# Patient Record
Sex: FEMALE | Race: WHITE | NOT HISPANIC OR LATINO | Employment: UNEMPLOYED | URBAN - METROPOLITAN AREA
[De-identification: names, ages, dates, MRNs, and addresses within clinical notes are randomized per-mention and may not be internally consistent; named-entity substitution may affect disease eponyms.]

---

## 2020-12-02 ENCOUNTER — HOSPITAL ENCOUNTER (OUTPATIENT)
Dept: GASTROENTEROLOGY | Facility: HOSPITAL | Age: 50
Setting detail: HOSPITAL OUTPATIENT SURGERY
Discharge: HOME | End: 2020-12-02
Attending: INTERNAL MEDICINE

## 2020-12-02 LAB — HCG UR QL: NEGATIVE

## 2021-06-21 ENCOUNTER — TRANSCRIBE ORDERS (OUTPATIENT)
Dept: ADMINISTRATIVE | Facility: HOSPITAL | Age: 51
End: 2021-06-21

## 2021-06-21 DIAGNOSIS — E04.9 GOITER: Primary | ICD-10-CM

## 2021-07-09 ENCOUNTER — HOSPITAL ENCOUNTER (OUTPATIENT)
Dept: ULTRASOUND IMAGING | Facility: HOSPITAL | Age: 51
Discharge: HOME | End: 2021-07-09
Admitting: NURSE PRACTITIONER

## 2021-07-09 DIAGNOSIS — E04.9 GOITER: ICD-10-CM

## 2021-07-09 PROCEDURE — 25010000003 LIDOCAINE 1 % SOLUTION: Performed by: NURSE PRACTITIONER

## 2021-07-09 PROCEDURE — 76942 ECHO GUIDE FOR BIOPSY: CPT

## 2021-07-09 PROCEDURE — 88173 CYTOPATH EVAL FNA REPORT: CPT | Performed by: NURSE PRACTITIONER

## 2021-07-09 PROCEDURE — 76942 ECHO GUIDE FOR BIOPSY: CPT | Performed by: RADIOLOGY

## 2021-07-09 PROCEDURE — 10005 FNA BX W/US GDN 1ST LES: CPT | Performed by: RADIOLOGY

## 2021-07-09 RX ORDER — LIDOCAINE HYDROCHLORIDE 10 MG/ML
10 INJECTION, SOLUTION INFILTRATION; PERINEURAL ONCE
Status: COMPLETED | OUTPATIENT
Start: 2021-07-09 | End: 2021-07-09

## 2021-07-09 RX ADMIN — LIDOCAINE HYDROCHLORIDE 10 ML: 10 INJECTION, SOLUTION INFILTRATION; PERINEURAL at 13:25

## 2021-07-13 LAB
CYTO UR: NORMAL
LAB AP CASE REPORT: NORMAL
LAB AP CLINICAL INFORMATION: NORMAL
LAB AP NON-GYN SPECIMEN ADEQUACY: NORMAL
PATH REPORT.FINAL DX SPEC: NORMAL
PATH REPORT.GROSS SPEC: NORMAL

## 2022-02-14 ENCOUNTER — LAB (OUTPATIENT)
Dept: LAB | Facility: HOSPITAL | Age: 52
End: 2022-02-14

## 2022-02-14 ENCOUNTER — OFFICE VISIT (OUTPATIENT)
Dept: GASTROENTEROLOGY | Facility: CLINIC | Age: 52
End: 2022-02-14

## 2022-02-14 VITALS
BODY MASS INDEX: 34.01 KG/M2 | SYSTOLIC BLOOD PRESSURE: 136 MMHG | WEIGHT: 224.4 LBS | HEIGHT: 68 IN | HEART RATE: 72 BPM | DIASTOLIC BLOOD PRESSURE: 95 MMHG

## 2022-02-14 DIAGNOSIS — R19.7 DIARRHEA, UNSPECIFIED TYPE: ICD-10-CM

## 2022-02-14 DIAGNOSIS — R14.0 ABDOMINAL BLOATING: ICD-10-CM

## 2022-02-14 DIAGNOSIS — R14.0 ABDOMINAL BLOATING: Primary | ICD-10-CM

## 2022-02-14 DIAGNOSIS — R15.2 FECAL URGENCY: ICD-10-CM

## 2022-02-14 PROCEDURE — 36415 COLL VENOUS BLD VENIPUNCTURE: CPT

## 2022-02-14 PROCEDURE — 86258 DGP ANTIBODY EACH IG CLASS: CPT

## 2022-02-14 PROCEDURE — 86364 TISS TRNSGLTMNASE EA IG CLAS: CPT

## 2022-02-14 PROCEDURE — 82784 ASSAY IGA/IGD/IGG/IGM EACH: CPT

## 2022-02-14 PROCEDURE — 99214 OFFICE O/P EST MOD 30 MIN: CPT | Performed by: NURSE PRACTITIONER

## 2022-02-14 RX ORDER — DICYCLOMINE HCL 20 MG
20 TABLET ORAL EVERY 6 HOURS PRN
Qty: 120 TABLET | Refills: 1 | Status: SHIPPED | OUTPATIENT
Start: 2022-02-14

## 2022-02-14 RX ORDER — CARVEDILOL 12.5 MG/1
TABLET ORAL
COMMUNITY

## 2022-02-14 RX ORDER — CETIRIZINE HYDROCHLORIDE, PSEUDOEPHEDRINE HYDROCHLORIDE 5; 120 MG/1; MG/1
TABLET, FILM COATED, EXTENDED RELEASE ORAL
COMMUNITY

## 2022-02-14 RX ORDER — OMEGA-3 FATTY ACIDS/FISH OIL 300-1000MG
CAPSULE ORAL
COMMUNITY

## 2022-02-14 RX ORDER — LOSARTAN POTASSIUM 50 MG/1
TABLET ORAL
COMMUNITY
Start: 2022-02-07

## 2022-02-14 RX ORDER — NORETHINDRONE ACETATE AND ETHINYL ESTRADIOL 1MG-20(21)
KIT ORAL
COMMUNITY
End: 2022-05-17

## 2022-02-14 NOTE — PROGRESS NOTES
Chief Complaint  dyspepsia    April Douglas is a 51 y.o. female who presents to [facility] on referral from Cachorro Bang MD for a gastroenterology evaluation of dyspepsia, bloating and diarrhea.      History of Present Illness    She reports gas that's been present for several years.  Worse at night when lying down.  For the past one year, she's had loose stools 5-6 times per day.  The first bowel movements in the morning is the most formed.  She describes stool to be more like #5 on the bristol stool chart.  Admits fecal urgency.  This is worse when eating out.      She has tried cutting back on gluten and lactose.      She uses and essential oil blend that helps relax stomach more.        She has developed hemorrhoids since loose stool has started.  She's using baneal lotion and it helps with discomfort.      Reports only occasional abdominal discomfort in epigastric region.  Will also experience abdominal bloating with abdominal distention.  She's unable to belch.      Denies nausea and vomiting unless she takes simethicone.        She has previously tried probiotics with no improvement in symptoms.      In her 20's, she was diagnosed with giardia.       History reviewed. No pertinent past medical history.    Past Surgical History:   Procedure Laterality Date   • CHOLECYSTECTOMY  1999   • COLONOSCOPY  2020         Current Outpatient Medications:   •  carvedilol (COREG) 12.5 MG tablet, carvedilol 12.5 mg tablet, Disp: , Rfl:   •  cetirizine-pseudoephedrine (ZyrTEC-D) 5-120 MG per 12 hr tablet, Take  by mouth., Disp: , Rfl:   •  Ibuprofen 200 MG capsule, ibuprofen 200 mg oral capsule take 1 - 2 capsules (200 - 400 mg) by oral route every 6 hours as needed   Active, Disp: , Rfl:   •  losartan (COZAAR) 50 MG tablet, , Disp: , Rfl:   •  norethindrone-ethinyl estradiol FE (Loestrin Fe 1/20) 1-20 MG-MCG per tablet, Loestrin Fe 1/20 (28-Day) 1 mg-20 mcg (21)/75 mg (7) oral  "tablet take 1 tablet by oral route once daily   Active, Disp: , Rfl:   •  dicyclomine (BENTYL) 20 MG tablet, Take 1 tablet by mouth Every 6 (Six) Hours As Needed (abdominal pain and diarrhea)., Disp: 120 tablet, Rfl: 1     Allergies   Allergen Reactions   • Clarithromycin Rash     rash         History reviewed. No pertinent family history.     Social History     Social History Narrative   • Not on file       Immunization:    There is no immunization history on file for this patient.     Objective       Vital Signs:   /95 (BP Location: Left arm, Patient Position: Sitting, Cuff Size: Adult)   Pulse 72   Ht 172.7 cm (68\")   Wt 102 kg (224 lb 6.4 oz)   BMI 34.12 kg/m²       Physical Exam  Constitutional:       General: She is not in acute distress.     Appearance: Normal appearance. She is well-developed and normal weight.   HENT:      Head: Normocephalic and atraumatic.   Eyes:      Conjunctiva/sclera: Conjunctivae normal.      Pupils: Pupils are equal, round, and reactive to light.      Visual Fields: Right eye visual fields normal and left eye visual fields normal.   Cardiovascular:      Rate and Rhythm: Normal rate and regular rhythm.      Heart sounds: Normal heart sounds.   Pulmonary:      Effort: Pulmonary effort is normal. No retractions.      Breath sounds: Normal breath sounds and air entry.   Abdominal:      General: Bowel sounds are normal.      Palpations: Abdomen is soft.      Tenderness: There is no abdominal tenderness.      Comments: No appreciable hepatosplenomegaly or ascites   Musculoskeletal:         General: Normal range of motion.      Cervical back: Neck supple.      Right lower leg: No edema.      Left lower leg: No edema.   Lymphadenopathy:      Cervical: No cervical adenopathy.   Skin:     General: Skin is warm and dry.      Findings: No lesion.   Neurological:      General: No focal deficit present.      Mental Status: She is alert and oriented to person, place, and time. "   Psychiatric:         Mood and Affect: Mood and affect normal.         Behavior: Behavior normal.         Result Review :   The following data was reviewed by: CLARISSE Lyles on 02/14/2022:    12/2/2020 colonoscopy-normal mucosa in the whole colon.                      Assessment and Plan    Diagnoses and all orders for this visit:    1. Abdominal bloating (Primary)  -     Celiac Panel Reflex To Titer; Future  -     Clostridium Difficile Toxin - Stool, Per Rectum; Future  -     Enteric Bacterial Panel - Stool, Per Rectum; Future  -     Enteric Parasite Panel - Stool, Per Rectum; Future  -     Occult Blood, Fecal By Immunoassay - Stool, Per Rectum; Future  -     Fecal Lactoferrin Qual. - Stool, Per Rectum; Future  -     Pancreatic Elastase, Fecal - Stool, Per Rectum; Future  -     H. Pylori Antigen, Stool - Stool, Per Rectum    2. Diarrhea, unspecified type  -     Celiac Panel Reflex To Titer; Future  -     Clostridium Difficile Toxin - Stool, Per Rectum; Future  -     Enteric Bacterial Panel - Stool, Per Rectum; Future  -     Enteric Parasite Panel - Stool, Per Rectum; Future  -     Occult Blood, Fecal By Immunoassay - Stool, Per Rectum; Future  -     Fecal Lactoferrin Qual. - Stool, Per Rectum; Future  -     Pancreatic Elastase, Fecal - Stool, Per Rectum; Future  -     H. Pylori Antigen, Stool - Stool, Per Rectum  -     dicyclomine (BENTYL) 20 MG tablet; Take 1 tablet by mouth Every 6 (Six) Hours As Needed (abdominal pain and diarrhea).  Dispense: 120 tablet; Refill: 1    3. Fecal urgency  -     Celiac Panel Reflex To Titer; Future  -     Clostridium Difficile Toxin - Stool, Per Rectum; Future  -     Enteric Bacterial Panel - Stool, Per Rectum; Future  -     Enteric Parasite Panel - Stool, Per Rectum; Future  -     Occult Blood, Fecal By Immunoassay - Stool, Per Rectum; Future  -     Fecal Lactoferrin Qual. - Stool, Per Rectum; Future  -     Pancreatic Elastase, Fecal - Stool, Per Rectum;  Future      Discussed with patient that if labs and stools are unrevealing, would consider sucrose breath test.  May also consider trial of xifaxin or colestid in the future.  Encouraged low FODMAP diet and food/symptoms diary.        Follow Up   Return in about 3 months (around 5/14/2022) for IBS.  Patient was given instructions and counseling regarding her condition or for health maintenance advice. Please see specific information pulled into the AVS if appropriate.

## 2022-02-14 NOTE — PATIENT INSTRUCTIONS
Low-FODMAP Eating Plan    FODMAP stands for fermentable oligosaccharides, disaccharides, monosaccharides, and polyols. These are sugars that are hard for some people to digest. A low-FODMAP eating plan may help some people who have irritable bowel syndrome (IBS) and certain other bowel (intestinal) diseases to manage their symptoms.  This meal plan can be complicated to follow. Work with a diet and nutrition specialist (dietitian) to make a low-FODMAP eating plan that is right for you. A dietitian can help make sure that you get enough nutrition from this diet.  What are tips for following this plan?  Reading food labels  · Check labels for hidden FODMAPs such as:  ? High-fructose syrup.  ? Honey.  ? Agave.  ? Natural fruit flavors.  ? Onion or garlic powder.  · Choose low-FODMAP foods that contain 3-4 grams of fiber per serving.  · Check food labels for serving sizes. Eat only one serving at a time to make sure FODMAP levels stay low.  Shopping  · Shop with a list of foods that are recommended on this diet and make a meal plan.  Meal planning  · Follow a low-FODMAP eating plan for up to 6 weeks, or as told by your health care provider or dietitian.  · To follow the eating plan:  1. Eliminate high-FODMAP foods from your diet completely. Choose only low-FODMAP foods to eat. You will do this for 2-6 weeks.  2. Gradually reintroduce high-FODMAP foods into your diet one at a time. Most people should wait a few days before introducing the next new high-FODMAP food into their meal plan. Your dietitian can recommend how quickly you may reintroduce foods.  3. Keep a daily record of what and how much you eat and drink. Make note of any symptoms that you have after eating.  4. Review your daily record with a dietitian regularly to identify which foods you can eat and which foods you should avoid.  General tips  · Drink enough fluid each day to keep your urine pale yellow.  · Avoid processed foods. These often have added sugar  "and may be high in FODMAPs.  · Avoid most dairy products, whole grains, and sweeteners.  · Work with a dietitian to make sure you get enough fiber in your diet.  · Avoid high FODMAP foods at meals to manage symptoms.  Recommended foods  Fruits  Bananas, oranges, tangerines, erica, limes, blueberries, raspberries, strawberries, grapes, cantaloupe, honeydew melon, kiwi, papaya, passion fruit, and pineapple. Limited amounts of dried cranberries, banana chips, and shredded coconut.  Vegetables  Eggplant, zucchini, cucumber, peppers, green beans, bean sprouts, lettuce, arugula, kale, Swiss chard, spinach, velvet greens, bok john, summer squash, potato, and tomato. Limited amounts of corn, carrot, and sweet potato. Green parts of scallions.  Grains  Gluten-free grains, such as rice, oats, buckwheat, quinoa, corn, polenta, and millet. Gluten-free pasta, bread, or cereal. Rice noodles. Corn tortillas.  Meats and other proteins  Unseasoned beef, pork, poultry, or fish. Eggs. Bartholomew. Tofu (firm) and tempeh. Limited amounts of nuts and seeds, such as almonds, walnuts, brazil nuts, pecans, peanuts, nut butters, pumpkin seeds, franci seeds, and sunflower seeds.  Dairy  Lactose-free milk, yogurt, and kefir. Lactose-free cottage cheese and ice cream. Non-dairy milks, such as almond, coconut, hemp, and rice milk. Non-dairy yogurt. Limited amounts of goat cheese, brie, mozzarella, parmesan, swiss, and other hard cheeses.  Fats and oils  Butter-free spreads. Vegetable oils, such as olive, canola, and sunflower oil.  Seasoning and other foods  Artificial sweeteners with names that do not end in \"ol,\" such as aspartame, saccharine, and stevia. Maple syrup, white table sugar, raw sugar, brown sugar, and molasses. Mayonnaise, soy sauce, and tamari. Fresh basil, coriander, parsley, rosemary, and thyme.  Beverages  Water and mineral water. Sugar-sweetened soft drinks. Small amounts of orange juice or cranberry juice. Black and green tea. " Most dry triny. Coffee.  The items listed above may not be a complete list of foods and beverages you can eat. Contact a dietitian for more information.  Foods to avoid  Fruits  Fresh, dried, and juiced forms of apple, pear, watermelon, peach, plum, cherries, apricots, blackberries, boysenberries, figs, nectarines, and guanakito. Avocado.  Vegetables  Chicory root, artichoke, asparagus, cabbage, snow peas, [place] sprouts, broccoli, sugar snap peas, mushrooms, celery, and cauliflower. Onions, garlic, leeks, and the white part of scallions.  Grains  Wheat, including kamut, durum, and semolina. Barley and bulgur. Couscous. Wheat-based cereals. Wheat noodles, bread, crackers, and pastries.  Meats and other proteins  Fried or fatty meat. Sausage. Cashews and pistachios. Soybeans, baked beans, black beans, chickpeas, kidney beans, gilbert beans, navy beans, lentils, black-eyed peas, and split peas.  Dairy  Milk, yogurt, ice cream, and soft cheese. Cream and sour cream. Milk-based sauces. Custard. Buttermilk. Soy milk.  Seasoning and other foods  Any sugar-free gum or candy. Foods that contain artificial sweeteners such as sorbitol, mannitol, isomalt, or xylitol. Foods that contain honey, high-fructose corn syrup, or agave. Bouillon, vegetable stock, beef stock, and chicken stock. Garlic and onion powder. Condiments made with onion, such as hummus, chutney, pickles, relish, salad dressing, and salsa. Tomato paste.  Beverages  Chicory-based drinks. Coffee substitutes. Chamomile tea. Fennel tea. Sweet or fortified triny such as port or agnieszka. Diet soft drinks made with isomalt, mannitol, maltitol, sorbitol, or xylitol. Apple, pear, and guanakito juice. Juices with high-fructose corn syrup.  The items listed above may not be a complete list of foods and beverages you should avoid. Contact a dietitian for more information.  Summary  · FODMAP stands for fermentable oligosaccharides, disaccharides, monosaccharides, and polyols. These  are sugars that are hard for some people to digest.  · A low-FODMAP eating plan is a short-term diet that helps to ease symptoms of certain bowel diseases.  · The eating plan usually lasts up to 6 weeks. After that, high-FODMAP foods are reintroduced gradually and one at a time. This can help you find out which foods may be causing symptoms.  · A low-FODMAP eating plan can be complicated. It is best to work with a dietitian who has experience with this type of plan.  This information is not intended to replace advice given to you by your health care provider. Make sure you discuss any questions you have with your health care provider.  Document Revised: 05/06/2021 Document Reviewed: 05/06/2021  Elsevier Patient Education © 2021 [employer]

## 2022-02-15 ENCOUNTER — LAB (OUTPATIENT)
Dept: LAB | Facility: HOSPITAL | Age: 52
End: 2022-02-15

## 2022-02-15 DIAGNOSIS — R14.0 ABDOMINAL BLOATING: ICD-10-CM

## 2022-02-15 DIAGNOSIS — R19.7 DIARRHEA, UNSPECIFIED TYPE: ICD-10-CM

## 2022-02-15 DIAGNOSIS — R15.2 FECAL URGENCY: ICD-10-CM

## 2022-02-15 LAB
027 TOXIN: NORMAL
C DIFF TOX GENS STL QL NAA+PROBE: NEGATIVE
GLIADIN PEPTIDE IGA SER-ACNC: 3 UNITS (ref 0–19)
H. PYLORI ANTIGEN STOOL: NEGATIVE
HEMOCCULT STL QL IA: NEGATIVE
IGA SERPL-MCNC: 241 MG/DL (ref 87–352)
LACTOFERRIN STL QL LA: NEGATIVE
TTG IGA SER-ACNC: <2 U/ML (ref 0–3)

## 2022-02-15 PROCEDURE — 83630 LACTOFERRIN FECAL (QUAL): CPT

## 2022-02-15 PROCEDURE — 87338 HPYLORI STOOL AG IA: CPT | Performed by: NURSE PRACTITIONER

## 2022-02-15 PROCEDURE — 82274 ASSAY TEST FOR BLOOD FECAL: CPT

## 2022-02-15 PROCEDURE — 87493 C DIFF AMPLIFIED PROBE: CPT

## 2022-02-15 PROCEDURE — 87506 IADNA-DNA/RNA PROBE TQ 6-11: CPT

## 2022-02-15 PROCEDURE — 82653 EL-1 FECAL QUANTITATIVE: CPT

## 2022-02-16 ENCOUNTER — TELEPHONE (OUTPATIENT)
Dept: GASTROENTEROLOGY | Facility: CLINIC | Age: 52
End: 2022-02-16

## 2022-02-16 LAB
C COLI+JEJ+UPSA DNA STL QL NAA+NON-PROBE: NOT DETECTED
CRYPTOSP DNA STL QL NAA+NON-PROBE: NOT DETECTED
E HISTOLYT DNA STL QL NAA+NON-PROBE: NOT DETECTED
EC STX1+STX2 GENES STL QL NAA+NON-PROBE: NOT DETECTED
G LAMBLIA DNA STL QL NAA+NON-PROBE: NOT DETECTED
S ENT+BONG DNA STL QL NAA+NON-PROBE: NOT DETECTED
SHIGELLA SP+EIEC IPAH ST NAA+NON-PROBE: NOT DETECTED

## 2022-02-16 NOTE — TELEPHONE ENCOUNTER
----- Message from CLARISSE Lyles sent at 2/16/2022  2:17 PM EST -----  Please let patient know that stool studies are all normal.  Has she noticed any improvement with the dicyclomine?  If not would like to add Colestid to see if that improves symptoms.

## 2022-02-16 NOTE — TELEPHONE ENCOUNTER
Patient notified of results, she has not started dicyclomine yet so I instructed her to start and give us a call if there is not improvement.

## 2022-02-18 LAB — ELASTASE PANC STL-MCNT: >500 UG ELAST./G

## 2022-02-21 ENCOUNTER — TRANSCRIBE ORDERS (OUTPATIENT)
Dept: ADMINISTRATIVE | Facility: HOSPITAL | Age: 52
End: 2022-02-21

## 2022-02-21 DIAGNOSIS — Z12.31 SCREENING MAMMOGRAM FOR HIGH-RISK PATIENT: Primary | ICD-10-CM

## 2022-02-23 ENCOUNTER — PATIENT ROUNDING (BHMG ONLY) (OUTPATIENT)
Dept: GASTROENTEROLOGY | Facility: CLINIC | Age: 52
End: 2022-02-23

## 2022-02-23 NOTE — PROGRESS NOTES
February 23, 2022    Hello, may I speak with April Douglas?    My name is Julianna CELIS     I am  with [facility]  [address] [address]  [facility] [address] [phone].    Before we get started may I verify your date of birth? 1970    I am calling to officially welcome you to our practice and ask about your recent visit. Is this a good time to talk? Yes     Tell me about your visit with us. What things went well?  Really liked Ana she was very easy to talk to & ordered labs that I felt was needed. It was a good visit      We're always looking for ways to make our patients' experiences even better. Do you have recommendations on ways we may improve?  No    Overall were you satisfied with your first visit to our practice? Yes        I appreciate you taking the time to speak with me today. Is there anything else I can do for you? No      Thank you, and have a great day.

## 2022-04-18 ENCOUNTER — HOSPITAL ENCOUNTER (OUTPATIENT)
Dept: MAMMOGRAPHY | Facility: HOSPITAL | Age: 52
Discharge: HOME | End: 2022-04-18
Admitting: INTERNAL MEDICINE

## 2022-04-18 DIAGNOSIS — Z12.31 SCREENING MAMMOGRAM FOR HIGH-RISK PATIENT: ICD-10-CM

## 2022-04-18 PROCEDURE — 77063 BREAST TOMOSYNTHESIS BI: CPT

## 2022-04-18 PROCEDURE — 77067 SCR MAMMO BI INCL CAD: CPT

## 2022-05-17 ENCOUNTER — OFFICE VISIT (OUTPATIENT)
Dept: GASTROENTEROLOGY | Facility: CLINIC | Age: 52
End: 2022-05-17

## 2022-05-17 VITALS
DIASTOLIC BLOOD PRESSURE: 84 MMHG | HEART RATE: 74 BPM | HEIGHT: 68 IN | WEIGHT: 223 LBS | BODY MASS INDEX: 33.8 KG/M2 | SYSTOLIC BLOOD PRESSURE: 134 MMHG

## 2022-05-17 DIAGNOSIS — K58.0 IRRITABLE BOWEL SYNDROME WITH DIARRHEA: Primary | ICD-10-CM

## 2022-05-17 DIAGNOSIS — R14.0 ABDOMINAL BLOATING: ICD-10-CM

## 2022-05-17 DIAGNOSIS — K64.9 HEMORRHOIDS, UNSPECIFIED HEMORRHOID TYPE: ICD-10-CM

## 2022-05-17 PROCEDURE — 99214 OFFICE O/P EST MOD 30 MIN: CPT | Performed by: NURSE PRACTITIONER

## 2022-05-17 RX ORDER — MONTELUKAST SODIUM 4 MG/1
1-2 TABLET, CHEWABLE ORAL 2 TIMES DAILY
Qty: 120 TABLET | Refills: 1 | Status: SHIPPED | OUTPATIENT
Start: 2022-05-17 | End: 2022-10-24

## 2022-05-17 RX ORDER — B.COAGUL,SUBTILIS/INULIN/VIT C 1B CELL-1G
1 TABLET,CHEWABLE ORAL DAILY
Qty: 28 EACH | Refills: 5 | Status: SHIPPED | OUTPATIENT
Start: 2022-05-17 | End: 2022-10-24

## 2022-05-17 RX ORDER — HYDROCORTISONE 25 MG/G
CREAM TOPICAL 2 TIMES DAILY
Qty: 1 EACH | Refills: 2 | Status: SHIPPED | OUTPATIENT
Start: 2022-05-17 | End: 2022-05-31

## 2022-05-17 RX ORDER — MINERAL,LANOLIN OILS/PROP GLYC
1 LOTION (ML) TOPICAL 4 TIMES DAILY PRN
Qty: 89 ML | Refills: 1 | Status: SHIPPED | OUTPATIENT
Start: 2022-05-17

## 2022-05-17 NOTE — PROGRESS NOTES
Chief Complaint  Irritable Bowel Syndrome    April Douglas is a 51 y.o. female who presents to [facility] for follow-up of abdominal bloating, diarrhea and fecal urgency.    History of present Illness    Dicyclomine was helpful for pain and slowed down bowel movements.  However, she noted feeling more bloated.  Tried IBGard which helped with the bloating, but if she takes more than a few days will experience heartburn.      She's having 5-6 bowel movements per day.  Stool burns and causes irritation in rectal area.  She is using balneol to cleanse and feels that it helps.    Previously tried imodium without improvement.            Past Medical History:   Diagnosis Date   • Irritable bowel syndrome        Past Surgical History:   Procedure Laterality Date   • CHOLECYSTECTOMY  1999   • COLONOSCOPY  2020         Current Outpatient Medications:   •  carvedilol (COREG) 12.5 MG tablet, carvedilol 12.5 mg tablet, Disp: , Rfl:   •  cetirizine-pseudoephedrine (ZyrTEC-D) 5-120 MG per 12 hr tablet, Take  by mouth., Disp: , Rfl:   •  dicyclomine (BENTYL) 20 MG tablet, Take 1 tablet by mouth Every 6 (Six) Hours As Needed (abdominal pain and diarrhea)., Disp: 120 tablet, Rfl: 1  •  Ibuprofen 200 MG capsule, ibuprofen 200 mg oral capsule take 1 - 2 capsules (200 - 400 mg) by oral route every 6 hours as needed   Active, Disp: , Rfl:   •  losartan (COZAAR) 50 MG tablet, , Disp: , Rfl:   •  3-Xmuxfqdzval-Vtpbw-N-neotetra (Culturelle IBS Complete) pack, Take 1 package by mouth Daily., Disp: 28 each, Rfl: 5  •  colestipol (COLESTID) 1 g tablet, Take 1-2 tablets by mouth 2 (Two) Times a Day., Disp: 120 tablet, Rfl: 1  •  Hydrocortisone, Perianal, (ANUSOL-HC) 2.5 % rectal cream, Insert  into the rectum 2 (Two) Times a Day for 14 days., Disp: 1 each, Rfl: 2  •  Incontinent Wash (Balneol) lotion, Apply 1 application topically 4 (Four) Times a Day As Needed (rectal pain)., Disp: 89 mL, Rfl: 1  "    Allergies   Allergen Reactions   • Clarithromycin Rash     rash         Family History   Problem Relation Age of Onset   • Colon cancer Neg Hx         Social History     Social History Narrative   • Not on file       Objective       Vital Signs:   /84 (BP Location: Left arm, Patient Position: Sitting, Cuff Size: Adult)   Pulse 74   Ht 172.7 cm (68\")   Wt 101 kg (223 lb)   BMI 33.91 kg/m²       Physical Exam  Constitutional:       General: She is not in acute distress.     Appearance: Normal appearance. She is well-developed and normal weight.   HENT:      Head: Normocephalic and atraumatic.   Eyes:      Conjunctiva/sclera: Conjunctivae normal.      Pupils: Pupils are equal, round, and reactive to light.      Visual Fields: Right eye visual fields normal and left eye visual fields normal.   Cardiovascular:      Rate and Rhythm: Normal rate and regular rhythm.      Heart sounds: Normal heart sounds.   Pulmonary:      Effort: Pulmonary effort is normal. No retractions.      Breath sounds: Normal breath sounds and air entry.   Abdominal:      General: Bowel sounds are normal.      Palpations: Abdomen is soft.      Tenderness: There is no abdominal tenderness.      Comments: No appreciable hepatosplenomegaly or ascites   Musculoskeletal:         General: Normal range of motion.      Cervical back: Neck supple.      Right lower leg: No edema.      Left lower leg: No edema.   Lymphadenopathy:      Cervical: No cervical adenopathy.   Skin:     General: Skin is warm and dry.      Findings: No lesion.   Neurological:      General: No focal deficit present.      Mental Status: She is alert and oriented to person, place, and time.   Psychiatric:         Mood and Affect: Mood and affect normal.         Behavior: Behavior normal.         Result Review :     The following data was reviewed by: CLARISSE Lyles on 05/17/2022:  2/15/2022 pancreatic elastase-greater than 500, Lactoferrin-negative, occult " blood-negative, enteric parasite panel-not detected, enteric bacterial panel-not detected.  C. difficile-negative.  H. pylori stool-negative.  Celiac serology-negative.                      Assessment and Plan    Diagnoses and all orders for this visit:    1. Irritable bowel syndrome with diarrhea (Primary)  -     colestipol (COLESTID) 1 g tablet; Take 1-2 tablets by mouth 2 (Two) Times a Day.  Dispense: 120 tablet; Refill: 1  -     4-Nlklrsswefs-Jnnyi-N-neotetra (Culturelle IBS Complete) pack; Take 1 package by mouth Daily.  Dispense: 28 each; Refill: 5    2. Abdominal bloating  -     Sucrose Breath Test; Future    3. Hemorrhoids, unspecified hemorrhoid type  -     Incontinent Wash (Balneol) lotion; Apply 1 application topically 4 (Four) Times a Day As Needed (rectal pain).  Dispense: 89 mL; Refill: 1  -     Hydrocortisone, Perianal, (ANUSOL-HC) 2.5 % rectal cream; Insert  into the rectum 2 (Two) Times a Day for 14 days.  Dispense: 1 each; Refill: 2            Follow Up   Return in about 4 months (around 9/17/2022) for IBS.  Patient was given instructions and counseling regarding her condition or for health maintenance advice. Please see specific information pulled into the AVS if appropriate.

## 2022-10-24 ENCOUNTER — OFFICE VISIT (OUTPATIENT)
Dept: GASTROENTEROLOGY | Facility: CLINIC | Age: 52
End: 2022-10-24

## 2022-10-24 VITALS
SYSTOLIC BLOOD PRESSURE: 147 MMHG | DIASTOLIC BLOOD PRESSURE: 88 MMHG | BODY MASS INDEX: 33.75 KG/M2 | WEIGHT: 222 LBS | HEART RATE: 77 BPM

## 2022-10-24 DIAGNOSIS — R14.0 ABDOMINAL BLOATING: ICD-10-CM

## 2022-10-24 DIAGNOSIS — B37.9 YEAST INFECTION: ICD-10-CM

## 2022-10-24 DIAGNOSIS — K58.0 IRRITABLE BOWEL SYNDROME WITH DIARRHEA: Primary | ICD-10-CM

## 2022-10-24 PROCEDURE — 99214 OFFICE O/P EST MOD 30 MIN: CPT | Performed by: NURSE PRACTITIONER

## 2022-10-24 RX ORDER — MONTELUKAST SODIUM 4 MG/1
1-2 TABLET, CHEWABLE ORAL 2 TIMES DAILY
Qty: 360 TABLET | Refills: 1 | Status: SHIPPED | OUTPATIENT
Start: 2022-10-24

## 2022-10-24 RX ORDER — FLUCONAZOLE 150 MG/1
150 TABLET ORAL
Qty: 3 TABLET | Refills: 0 | Status: SHIPPED | OUTPATIENT
Start: 2022-10-24

## 2022-10-24 NOTE — PROGRESS NOTES
Chief Complaint     Irritable Bowel Syndrome    History of Present Illness     April Douglas is a 51 y.o. female who presents to [facility] for follow-up of IBS-D, abdominal bloating and hemorrhoids.     She feels that colestid has improved stool frequency and consistency.  She is taking 2 grams daily.      She's found that if she avoids dairy and breads abdominal bloating is improved.        Reports feeling like she has a yeast infection in perineal area.  She has previously tried diflucan with improvement.      She has noticed a little improvement in hemorrhoids since she's having less bowel movements.  Still feels that they are bulging.         History      Past Medical History:   Diagnosis Date   • Irritable bowel syndrome      Past Surgical History:   Procedure Laterality Date   • CHOLECYSTECTOMY  1999   • COLONOSCOPY  2020     Family History   Problem Relation Age of Onset   • Colon cancer Neg Hx         Current Medications       Current Outpatient Medications:   •  carvedilol (COREG) 12.5 MG tablet, carvedilol 12.5 mg tablet, Disp: , Rfl:   •  cetirizine-pseudoephedrine (ZyrTEC-D) 5-120 MG per 12 hr tablet, Take  by mouth., Disp: , Rfl:   •  colestipol (COLESTID) 1 g tablet, Take 1-2 tablets by mouth 2 (Two) Times a Day., Disp: 360 tablet, Rfl: 1  •  dicyclomine (BENTYL) 20 MG tablet, Take 1 tablet by mouth Every 6 (Six) Hours As Needed (abdominal pain and diarrhea)., Disp: 120 tablet, Rfl: 1  •  Ibuprofen 200 MG capsule, ibuprofen 200 mg oral capsule take 1 - 2 capsules (200 - 400 mg) by oral route every 6 hours as needed   Active, Disp: , Rfl:   •  Incontinent Wash (Balneol) lotion, Apply 1 application topically 4 (Four) Times a Day As Needed (rectal pain)., Disp: 89 mL, Rfl: 1  •  losartan (COZAAR) 50 MG tablet, , Disp: , Rfl:   •  fluconazole (Diflucan) 150 MG tablet, Take 1 tablet by mouth Every 3 (Three) Days., Disp: 3 tablet, Rfl: 0     Allergies     Allergies    Allergen Reactions   • Clarithromycin Rash     rash         Social History       Social History     Social History Narrative   • Not on file         Objective       /88 (BP Location: Left arm, Patient Position: Sitting, Cuff Size: Adult)   Pulse 77   Wt 101 kg (222 lb)   BMI 33.75 kg/m²       Physical Exam    Results       Result Review :    The following data was reviewed by: CLARISSE Lyles on 10/24/2022:    5/17/2022 sucrose breath test-normal sucrase activity.                   Assessment and Plan              Diagnoses and all orders for this visit:    1. Irritable bowel syndrome with diarrhea (Primary)  -     colestipol (COLESTID) 1 g tablet; Take 1-2 tablets by mouth 2 (Two) Times a Day.  Dispense: 360 tablet; Refill: 1    2. Abdominal bloating    3. Yeast infection  -     fluconazole (Diflucan) 150 MG tablet; Take 1 tablet by mouth Every 3 (Three) Days.  Dispense: 3 tablet; Refill: 0      Recommend increasing colestid to 4 grams daily.       Follow Up     Follow Up   Return in about 6 months (around 4/24/2023) for IBS.  Patient was given instructions and counseling regarding her condition or for health maintenance advice. Please see specific information pulled into the AVS if appropriate.

## 2022-10-24 NOTE — PATIENT INSTRUCTIONS
Low-FODMAP Eating Plan  FODMAP stands for fermentable oligosaccharides, disaccharides, monosaccharides, and polyols. These are sugars that are hard for some people to digest. A low-FODMAP eating plan may help some people who have irritable bowel syndrome (IBS) and certain other bowel (intestinal) diseases to manage their symptoms.  This meal plan can be complicated to follow. Work with a diet and nutrition specialist (dietitian) to make a low-FODMAP eating plan that is right for you. A dietitian can help make sure that you get enough nutrition from this diet.  What are tips for following this plan?  Reading food labels  Check labels for hidden FODMAPs such as:  High-fructose syrup.  Honey.  Agave.  Natural fruit flavors.  Onion or garlic powder.  Choose low-FODMAP foods that contain 3-4 grams of fiber per serving.  Check food labels for serving sizes. Eat only one serving at a time to make sure FODMAP levels stay low.  Shopping  Shop with a list of foods that are recommended on this diet and make a meal plan.  Meal planning  Follow a low-FODMAP eating plan for up to 6 weeks, or as told by your health care provider or dietitian.  To follow the eating plan:  Eliminate high-FODMAP foods from your diet completely. Choose only low-FODMAP foods to eat. You will do this for 2-6 weeks.  Gradually reintroduce high-FODMAP foods into your diet one at a time. Most people should wait a few days before introducing the next new high-FODMAP food into their meal plan. Your dietitian can recommend how quickly you may reintroduce foods.  Keep a daily record of what and how much you eat and drink. Make note of any symptoms that you have after eating.  Review your daily record with a dietitian regularly to identify which foods you can eat and which foods you should avoid.  General tips  Drink enough fluid each day to keep your urine pale yellow.  Avoid processed foods. These often have added sugar and may be high in FODMAPs.  Avoid most  "dairy products, whole grains, and sweeteners.  Work with a dietitian to make sure you get enough fiber in your diet.  Avoid high FODMAP foods at meals to manage symptoms.  Recommended foods  Fruits  Bananas, oranges, tangerines, erica, limes, blueberries, raspberries, strawberries, grapes, cantaloupe, honeydew melon, kiwi, papaya, passion fruit, and pineapple. Limited amounts of dried cranberries, banana chips, and shredded coconut.  Vegetables  Eggplant, zucchini, cucumber, peppers, green beans, bean sprouts, lettuce, arugula, kale, Swiss chard, spinach, velvet greens, bok john, summer squash, potato, and tomato. Limited amounts of corn, carrot, and sweet potato. Green parts of scallions.  Grains  Gluten-free grains, such as rice, oats, buckwheat, quinoa, corn, polenta, and millet. Gluten-free pasta, bread, or cereal. Rice noodles. Corn tortillas.  Meats and other proteins  Unseasoned beef, pork, poultry, or fish. Eggs. Bartholmoew. Tofu (firm) and tempeh. Limited amounts of nuts and seeds, such as almonds, walnuts, brazil nuts, pecans, peanuts, nut butters, pumpkin seeds, franci seeds, and sunflower seeds.  Dairy  Lactose-free milk, yogurt, and kefir. Lactose-free cottage cheese and ice cream. Non-dairy milks, such as almond, coconut, hemp, and rice milk. Non-dairy yogurt. Limited amounts of goat cheese, brie, mozzarella, parmesan, swiss, and other hard cheeses.  Fats and oils  Butter-free spreads. Vegetable oils, such as olive, canola, and sunflower oil.  Seasoning and other foods  Artificial sweeteners with names that do not end in \"ol,\" such as aspartame, saccharine, and stevia. Maple syrup, white table sugar, raw sugar, brown sugar, and molasses. Mayonnaise, soy sauce, and tamari. Fresh basil, coriander, parsley, rosemary, and thyme.  Beverages  Water and mineral water. Sugar-sweetened soft drinks. Small amounts of orange juice or cranberry juice. Black and green tea. Most dry triny. Coffee.  The items listed above " may not be a complete list of foods and beverages you can eat. Contact a dietitian for more information.  Foods to avoid  Fruits  Fresh, dried, and juiced forms of apple, pear, watermelon, peach, plum, cherries, apricots, blackberries, boysenberries, figs, nectarines, and guanakito. Avocado.  Vegetables  Chicory root, artichoke, asparagus, cabbage, snow peas, George sprouts, broccoli, sugar snap peas, mushrooms, celery, and cauliflower. Onions, garlic, leeks, and the white part of scallions.  Grains  Wheat, including kamut, durum, and semolina. Barley and bulgur. Couscous. Wheat-based cereals. Wheat noodles, bread, crackers, and pastries.  Meats and other proteins  Fried or fatty meat. Sausage. Cashews and pistachios. Soybeans, baked beans, black beans, chickpeas, kidney beans, gilbert beans, navy beans, lentils, black-eyed peas, and split peas.  Dairy  Milk, yogurt, ice cream, and soft cheese. Cream and sour cream. Milk-based sauces. Custard. Buttermilk. Soy milk.  Seasoning and other foods  Any sugar-free gum or candy. Foods that contain artificial sweeteners such as sorbitol, mannitol, isomalt, or xylitol. Foods that contain honey, high-fructose corn syrup, or agave. Bouillon, vegetable stock, beef stock, and chicken stock. Garlic and onion powder. Condiments made with onion, such as hummus, chutney, pickles, relish, salad dressing, and salsa. Tomato paste.  Beverages  Chicory-based drinks. Coffee substitutes. Chamomile tea. Fennel tea. Sweet or fortified triny such as port or agnieszka. Diet soft drinks made with isomalt, mannitol, maltitol, sorbitol, or xylitol. Apple, pear, and guanakito juice. Juices with high-fructose corn syrup.  The items listed above may not be a complete list of foods and beverages you should avoid. Contact a dietitian for more information.  Summary  FODMAP stands for fermentable oligosaccharides, disaccharides, monosaccharides, and polyols. These are sugars that are hard for some people to  digest.  A low-FODMAP eating plan is a short-term diet that helps to ease symptoms of certain bowel diseases.  The eating plan usually lasts up to 6 weeks. After that, high-FODMAP foods are reintroduced gradually and one at a time. This can help you find out which foods may be causing symptoms.  A low-FODMAP eating plan can be complicated. It is best to work with a dietitian who has experience with this type of plan.  This information is not intended to replace advice given to you by your health care provider. Make sure you discuss any questions you have with your health care provider.  Document Revised: 05/06/2021 Document Reviewed: 05/06/2021  Elsevier Patient Education © 2022 [employer]

## 2023-03-09 ENCOUNTER — TRANSCRIBE ORDERS (OUTPATIENT)
Dept: ADMINISTRATIVE | Facility: HOSPITAL | Age: 53
End: 2023-03-09
Payer: OTHER GOVERNMENT

## 2023-03-09 DIAGNOSIS — Z12.31 VISIT FOR SCREENING MAMMOGRAM: Primary | ICD-10-CM

## 2023-04-24 ENCOUNTER — OFFICE VISIT (OUTPATIENT)
Dept: GASTROENTEROLOGY | Facility: CLINIC | Age: 53
End: 2023-04-24
Payer: OTHER GOVERNMENT

## 2023-04-24 VITALS
BODY MASS INDEX: 33.65 KG/M2 | DIASTOLIC BLOOD PRESSURE: 89 MMHG | HEART RATE: 74 BPM | HEIGHT: 68 IN | WEIGHT: 222 LBS | SYSTOLIC BLOOD PRESSURE: 133 MMHG

## 2023-04-24 DIAGNOSIS — R19.7 DIARRHEA, UNSPECIFIED TYPE: ICD-10-CM

## 2023-04-24 DIAGNOSIS — K58.0 IRRITABLE BOWEL SYNDROME WITH DIARRHEA: Primary | ICD-10-CM

## 2023-04-24 PROCEDURE — 99214 OFFICE O/P EST MOD 30 MIN: CPT | Performed by: NURSE PRACTITIONER

## 2023-04-24 RX ORDER — DICYCLOMINE HCL 20 MG
20 TABLET ORAL EVERY 6 HOURS PRN
Qty: 120 TABLET | Refills: 1 | Status: SHIPPED | OUTPATIENT
Start: 2023-04-24

## 2023-04-24 RX ORDER — MONTELUKAST SODIUM 4 MG/1
1-2 TABLET, CHEWABLE ORAL 2 TIMES DAILY
Qty: 360 TABLET | Refills: 1 | Status: SHIPPED | OUTPATIENT
Start: 2023-04-24

## 2023-04-24 RX ORDER — FLUTICASONE PROPIONATE 50 MCG
SPRAY, SUSPENSION (ML) NASAL
COMMUNITY
Start: 2023-04-17

## 2023-04-24 RX ORDER — CETIRIZINE HYDROCHLORIDE 10 MG/1
TABLET ORAL
COMMUNITY
Start: 2023-04-17

## 2023-04-24 NOTE — PROGRESS NOTES
Chief Complaint     Irritable Bowel Syndrome    History of Present Illness     April Douglas is a 52 y.o. female who presents to [facility] for follow-up of IBS-D and  abdominal bloating.      She reports that she's feeling much better.      Now only having 3-4 days per month with loose stools.  Taking colestid 2 grams nightly.      Had two episodes of severe abdominal pain in December and January.  States that pain was in lower abdomen.  This lasted for a few hours.  She tried dicyclomine without much improvement.      She may be moving to [place] for about two years.       History      Past Medical History:   Diagnosis Date   • Irritable bowel syndrome      Past Surgical History:   Procedure Laterality Date   • CHOLECYSTECTOMY  1999   • COLONOSCOPY  2020     Family History   Problem Relation Age of Onset   • Colon cancer Neg Hx         Current Medications       Current Outpatient Medications:   •  carvedilol (COREG) 12.5 MG tablet, carvedilol 12.5 mg tablet, Disp: , Rfl:   •  cetirizine (zyrTEC) 10 MG tablet, , Disp: , Rfl:   •  colestipol (COLESTID) 1 g tablet, Take 1-2 tablets by mouth 2 (Two) Times a Day., Disp: 360 tablet, Rfl: 1  •  dicyclomine (BENTYL) 20 MG tablet, Take 1 tablet by mouth Every 6 (Six) Hours As Needed (abdominal pain and diarrhea)., Disp: 120 tablet, Rfl: 1  •  fluticasone (FLONASE) 50 MCG/ACT nasal spray, , Disp: , Rfl:   •  Ibuprofen 200 MG capsule, ibuprofen 200 mg oral capsule take 1 - 2 capsules (200 - 400 mg) by oral route every 6 hours as needed   Active, Disp: , Rfl:   •  Incontinent Wash (Balneol) lotion, Apply 1 application topically 4 (Four) Times a Day As Needed (rectal pain)., Disp: 89 mL, Rfl: 1  •  losartan (COZAAR) 50 MG tablet, , Disp: , Rfl:      Allergies     Allergies   Allergen Reactions   • Clarithromycin Rash     rash         Social History       Social History     Social History Narrative   • Not on file         Objective       BP  "133/89 (BP Location: Left arm, Patient Position: Sitting, Cuff Size: Adult)   Pulse 74   Ht 172.7 cm (68\")   Wt 101 kg (222 lb)   BMI 33.75 kg/m²       Physical Exam    Results       Result Review :                     Assessment and Plan              Diagnoses and all orders for this visit:    1. Irritable bowel syndrome with diarrhea (Primary)  -     colestipol (COLESTID) 1 g tablet; Take 1-2 tablets by mouth 2 (Two) Times a Day.  Dispense: 360 tablet; Refill: 1    2. Diarrhea, unspecified type  -     dicyclomine (BENTYL) 20 MG tablet; Take 1 tablet by mouth Every 6 (Six) Hours As Needed (abdominal pain and diarrhea).  Dispense: 120 tablet; Refill: 1            Follow Up     Follow Up   Return in about 6 months (around 10/24/2023) for IBS.  Patient was given instructions and counseling regarding her condition or for health maintenance advice. Please see specific information pulled into the AVS if appropriate.     "

## 2023-05-30 ENCOUNTER — HOSPITAL ENCOUNTER (OUTPATIENT)
Dept: MAMMOGRAPHY | Facility: HOSPITAL | Age: 53
Discharge: HOME | End: 2023-05-30
Admitting: INTERNAL MEDICINE

## 2023-05-30 DIAGNOSIS — Z12.31 VISIT FOR SCREENING MAMMOGRAM: ICD-10-CM

## 2023-05-30 PROCEDURE — 77067 SCR MAMMO BI INCL CAD: CPT

## 2023-05-30 PROCEDURE — 77063 BREAST TOMOSYNTHESIS BI: CPT

## 2023-06-02 ENCOUNTER — TRANSCRIBE ORDERS (OUTPATIENT)
Dept: ADMINISTRATIVE | Facility: HOSPITAL | Age: 53
End: 2023-06-02
Payer: OTHER GOVERNMENT

## 2023-06-02 DIAGNOSIS — R92.8 ABNORMAL MAMMOGRAM: Primary | ICD-10-CM

## 2023-06-14 ENCOUNTER — HOSPITAL ENCOUNTER (OUTPATIENT)
Dept: MAMMOGRAPHY | Facility: HOSPITAL | Age: 53
Discharge: HOME | End: 2023-06-14
Payer: OTHER GOVERNMENT

## 2023-06-14 ENCOUNTER — HOSPITAL ENCOUNTER (OUTPATIENT)
Dept: ULTRASOUND IMAGING | Facility: HOSPITAL | Age: 53
Discharge: HOME | End: 2023-06-14
Payer: OTHER GOVERNMENT

## 2023-06-14 DIAGNOSIS — R92.8 ABNORMAL MAMMOGRAM: ICD-10-CM

## 2023-06-14 PROCEDURE — 77065 DX MAMMO INCL CAD UNI: CPT

## 2023-06-14 PROCEDURE — G0279 TOMOSYNTHESIS, MAMMO: HCPCS

## 2023-06-14 PROCEDURE — 76642 ULTRASOUND BREAST LIMITED: CPT

## 2023-09-24 ENCOUNTER — HOSPITAL ENCOUNTER (EMERGENCY)
Facility: HOSPITAL | Age: 53
Discharge: HOME | End: 2023-09-24
Attending: EMERGENCY MEDICINE | Admitting: EMERGENCY MEDICINE
Payer: OTHER GOVERNMENT

## 2023-09-24 ENCOUNTER — APPOINTMENT (OUTPATIENT)
Dept: GENERAL RADIOLOGY | Facility: HOSPITAL | Age: 53
End: 2023-09-24
Payer: OTHER GOVERNMENT

## 2023-09-24 VITALS
DIASTOLIC BLOOD PRESSURE: 67 MMHG | WEIGHT: 222.66 LBS | TEMPERATURE: 97.7 F | RESPIRATION RATE: 18 BRPM | SYSTOLIC BLOOD PRESSURE: 109 MMHG | BODY MASS INDEX: 33.75 KG/M2 | OXYGEN SATURATION: 100 % | HEIGHT: 68 IN | HEART RATE: 66 BPM

## 2023-09-24 DIAGNOSIS — S93.402A SPRAIN OF LEFT ANKLE, UNSPECIFIED LIGAMENT, INITIAL ENCOUNTER: Primary | ICD-10-CM

## 2023-09-24 PROCEDURE — 73610 X-RAY EXAM OF ANKLE: CPT

## 2023-09-24 PROCEDURE — 99283 EMERGENCY DEPT VISIT LOW MDM: CPT

## 2023-09-24 PROCEDURE — 73630 X-RAY EXAM OF FOOT: CPT

## 2023-09-24 RX ORDER — ACETAMINOPHEN 500 MG
1000 TABLET ORAL ONCE
Status: COMPLETED | OUTPATIENT
Start: 2023-09-24 | End: 2023-09-24

## 2023-09-24 RX ADMIN — ACETAMINOPHEN 1000 MG: 500 TABLET ORAL at 13:39

## 2023-09-24 NOTE — ED PROVIDER NOTES
Time: 2:14 PM EDT  Date of encounter:  9/24/2023  Independent Historian/Clinical History and Information was obtained by:   Patient    History is limited by: N/A    Chief Complaint: Left ankle/foot pain      History of Present Illness:  Patient is a 52 y.o. year old female who presents to the emergency department for evaluation of left ankle/foot pain that began today when she was climbing down a ladder and missed the last rung.  Patient states she rolled her ankle and is now having left ankle/foot pain.  Patient states she does not want narcotic pain medication.  Patient states she has sensation in her toes.    HPI    Patient Care Team  Primary Care Provider: Cachorro Bang MD    Past Medical History:     Allergies   Allergen Reactions    Clarithromycin Rash     rash       Past Medical History:   Diagnosis Date    Irritable bowel syndrome      Past Surgical History:   Procedure Laterality Date    CHOLECYSTECTOMY  1999    COLONOSCOPY  2020     Family History   Problem Relation Age of Onset    Colon cancer Neg Hx        Home Medications:  Prior to Admission medications    Medication Sig Start Date End Date Taking? Authorizing Provider   carvedilol (COREG) 12.5 MG tablet carvedilol 12.5 mg tablet    ProviderSara MD   cetirizine (zyrTEC) 10 MG tablet  4/17/23   Sara Sinclair MD   colestipol (COLESTID) 1 g tablet Take 1-2 tablets by mouth 2 (Two) Times a Day. 4/24/23   Norma Darnell APRN   dicyclomine (BENTYL) 20 MG tablet Take 1 tablet by mouth Every 6 (Six) Hours As Needed (abdominal pain and diarrhea). 4/24/23   Norma Darnell APRN   fluticasone (FLONASE) 50 MCG/ACT nasal spray  4/17/23   Sara Sinclair MD   Ibuprofen 200 MG capsule ibuprofen 200 mg oral capsule take 1 - 2 capsules (200 - 400 mg) by oral route every 6 hours as needed   Active    Sara Sinclair MD   Incontinent Wash (Balneol) lotion Apply 1 application topically 4 (Four) Times a Day As Needed  "(rectal pain). 5/17/22   Norma Darnell, CLARISSE   losartan (COZAAR) 50 MG tablet  2/7/22   Provider, MD Sara        Social History:   Social History     Tobacco Use    Smoking status: Never    Smokeless tobacco: Never   Vaping Use    Vaping Use: Never used   Substance Use Topics    Alcohol use: Not Currently     Comment: occasional         Review of Systems:  Review of Systems   Constitutional:  Negative for chills and fever.   HENT:  Negative for congestion, rhinorrhea and sore throat.    Eyes:  Negative for pain and visual disturbance.   Respiratory:  Negative for apnea, cough, chest tightness and shortness of breath.    Cardiovascular:  Negative for chest pain and palpitations.   Gastrointestinal:  Negative for abdominal pain, diarrhea, nausea and vomiting.   Genitourinary:  Negative for difficulty urinating and dysuria.   Musculoskeletal:  Positive for arthralgias. Negative for joint swelling and myalgias.   Skin:  Negative for color change.   Neurological:  Negative for seizures and headaches.   Psychiatric/Behavioral: Negative.     All other systems reviewed and are negative.     Physical Exam:  /67 (BP Location: Left arm, Patient Position: Sitting)   Pulse 66   Temp 97.7 °F (36.5 °C) (Oral)   Resp 18   Ht 172.7 cm (67.99\")   Wt 101 kg (222 lb 10.6 oz)   SpO2 100%   BMI 33.86 kg/m²     Physical Exam  Vitals and nursing note reviewed.   Constitutional:       General: She is not in acute distress.     Appearance: Normal appearance. She is not toxic-appearing.   HENT:      Head: Normocephalic and atraumatic.      Jaw: There is normal jaw occlusion.   Eyes:      General: Lids are normal.      Extraocular Movements: Extraocular movements intact.      Conjunctiva/sclera: Conjunctivae normal.      Pupils: Pupils are equal, round, and reactive to light.   Cardiovascular:      Rate and Rhythm: Normal rate and regular rhythm.      Pulses: Normal pulses.      Heart sounds: Normal heart sounds. "   Pulmonary:      Effort: Pulmonary effort is normal. No respiratory distress.      Breath sounds: Normal breath sounds. No wheezing or rhonchi.   Abdominal:      General: Abdomen is flat.      Palpations: Abdomen is soft.      Tenderness: There is no abdominal tenderness. There is no guarding or rebound.   Musculoskeletal:         General: Swelling (Left ankle/foot) and tenderness (Left ankle/foot) present.      Cervical back: Normal range of motion and neck supple.      Right lower leg: No edema.      Left lower leg: No edema.      Comments: Left ankle/foot decreased range of motion secondary to pain   Skin:     General: Skin is warm and dry.      Capillary Refill: Capillary refill takes less than 2 seconds.   Neurological:      Mental Status: She is alert and oriented to person, place, and time. Mental status is at baseline.   Psychiatric:         Mood and Affect: Mood normal.                Procedures:  Procedures      Medical Decision Making:      Comorbidities that affect care:    Irritable bowel syndrome    External Notes reviewed:          The following orders were placed and all results were independently analyzed by me:  Orders Placed This Encounter   Procedures    [facility] [place] 08.  CAM Boot    XR Foot 3+ View Left    XR Ankle 3+ View Left       Medications Given in the Emergency Department:  Medications   acetaminophen (TYLENOL) tablet 1,000 mg (1,000 mg Oral Given 9/24/23 1339)        ED Course:         Labs:    Lab Results (last 24 hours)       ** No results found for the last 24 hours. **             Imaging:    XR Ankle 3+ View Left    Result Date: 9/24/2023  PROCEDURE: XR ANKLE 3+ VW LEFT  COMPARISON: None  INDICATIONS: fall, pain.  LEFT MEDIAL ANKLE PAIN AFTER FALLING OFF STEP LADDER TODAY.  FINDINGS:  BONES: Normal.  No significant arthropathy or acute abnormality.  SOFT TISSUES: Negative.  No visible soft tissue swelling.  EFFUSION: None visible.  OTHER: Negative.        Normal  examination.       SANDY HENNING MD       Electronically Signed and Approved By: SANDY HENNING MD on 9/24/2023 at 14:24             XR Foot 3+ View Left    Result Date: 9/24/2023  PROCEDURE: XR FOOT 3+ VW LEFT  COMPARISON: [facility], CR, XR ANKLE 3+ VW LEFT, 9/24/2023, 13:29.  INDICATIONS: pain, fall.  LEFT MEDIAL FOOT PAIN AFTER FALLING OFF STEP LADDER TODAY.  FINDINGS:  BONES: Normal.  No significant arthropathy or acute abnormality.  SOFT TISSUES: Negative.  No visible soft tissue swelling.  EFFUSION: None visible.  OTHER: Negative.        Normal examination.       SANDY HENNING MD       Electronically Signed and Approved By: SANDY HENNING MD on 9/24/2023 at 14:25                Differential Diagnosis and Discussion:    Extremity Pain: Differential diagnosis includes but is not limited to soft tissue sprain, tendonitis, tendon injury, dislocation, fracture, deep vein thrombosis, arterial insufficiency, osteoarthritis, bursitis, and ligamentous damage.    All X-rays impressions were independently interpreted by me.    MDM     Amount and/or Complexity of Data Reviewed  Tests in the radiology section of CPT®: reviewed       X-ray of the foot and ankle were unremarkable.  I will give the patient a walking boot for support.  Patient has no calf tenderness at this time.  Patient has sensation in all toes and capillary refill less than 2 seconds.  Patient states her pain is much better with Tylenol.  I Instructed the patient to return to the ER if she developed severe pain, pallor in the foot/ankle, numbness to the foot/toes, calf pain/tenderness, cellulitic changes such as erythema, warmth, or increased swelling.  Patient states she understands and agrees with plan of care.    I have ordered a CAM boot on the patient. The patient is able to ambulate. The patient requires stabilization due to high ankle sprain . The patient has the potential to benefit functionally from the brace because it  will provide stability and protection .       Patient Care Considerations:          Consultants/Shared Management Plan:    None    Social Determinants of Health:    Patient is independent, reliable, and has access to care.       Disposition and Care Coordination:    Discharged: I considered escalation of care by admitting this patient to the hospital, however the patient has improved and is suitable and stable for discharge.    I have explained the patient´s condition, diagnoses and treatment plan based on the information available to me at this time. I have answered questions and addressed any concerns. The patient has a good  understanding of the patient´s diagnosis, condition, and treatment plan as can be expected at this point. The vital signs have been stable. The patient´s condition is stable and appropriate for discharge from the emergency department.      The patient will pursue further outpatient evaluation with the primary care physician or other designated or consulting physician as outlined in the discharge instructions. They are agreeable to this plan of care and follow-up instructions have been explained in detail. The patient has received these instructions in written format and have expressed an understanding of the discharge instructions. The patient is aware that any significant change in condition or worsening of symptoms should prompt an immediate return to this or the closest emergency department or call to 911.  I have explained discharge medications and the need for follow up with the patient/caretakers. This was also printed in the discharge instructions. Patient was discharged with the following medications and follow up:      Medication List      No changes were made to your prescriptions during this visit.      Cachorro Bang MD  [address] [place] [address]  [phone]    Call in 2 days  As needed       Final diagnoses:   Sprain of left ankle, unspecified ligament,  initial encounter        ED Disposition       ED Disposition   Discharge    Condition   Stable    Comment   --               This medical record created using voice recognition software.             Wing Murphy PA-C  09/24/23 1441       Wing Murphy PA-C  09/24/23 1442       Wing Murphy PA-C  09/24/23 1442